# Patient Record
Sex: FEMALE
[De-identification: names, ages, dates, MRNs, and addresses within clinical notes are randomized per-mention and may not be internally consistent; named-entity substitution may affect disease eponyms.]

---
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
## 2021-06-22 ENCOUNTER — APPOINTMENT (OUTPATIENT)
Dept: INTERNAL MEDICINE | Facility: CLINIC | Age: 21
End: 2021-06-22
Payer: COMMERCIAL

## 2021-06-22 VITALS
HEIGHT: 66 IN | RESPIRATION RATE: 14 BRPM | WEIGHT: 126.2 LBS | BODY MASS INDEX: 20.28 KG/M2 | HEART RATE: 71 BPM | OXYGEN SATURATION: 99 % | DIASTOLIC BLOOD PRESSURE: 64 MMHG | TEMPERATURE: 97.8 F | SYSTOLIC BLOOD PRESSURE: 97 MMHG

## 2021-06-22 DIAGNOSIS — S99.912A UNSPECIFIED INJURY OF LEFT ANKLE, INITIAL ENCOUNTER: ICD-10-CM

## 2021-06-22 DIAGNOSIS — Z23 ENCOUNTER FOR IMMUNIZATION: ICD-10-CM

## 2021-06-22 DIAGNOSIS — R63.6 UNDERWEIGHT: ICD-10-CM

## 2021-06-22 DIAGNOSIS — R53.81 OTHER MALAISE: ICD-10-CM

## 2021-06-22 DIAGNOSIS — R53.83 OTHER MALAISE: ICD-10-CM

## 2021-06-22 PROCEDURE — 99214 OFFICE O/P EST MOD 30 MIN: CPT

## 2021-06-24 DIAGNOSIS — Z23 ENCOUNTER FOR IMMUNIZATION: ICD-10-CM

## 2021-06-25 LAB
HBV SURFACE AB SER QL: NONREACTIVE
M TB IFN-G BLD-IMP: NEGATIVE
MEV IGG FLD QL IA: 260 AU/ML
MEV IGG+IGM SER-IMP: POSITIVE
MUV AB SER-ACNC: POSITIVE
MUV IGG SER QL IA: >300 AU/ML
QUANTIFERON TB PLUS MITOGEN MINUS NIL: >10 IU/ML
QUANTIFERON TB PLUS NIL: 0.02 IU/ML
QUANTIFERON TB PLUS TB1 MINUS NIL: 0 IU/ML
QUANTIFERON TB PLUS TB2 MINUS NIL: 0 IU/ML
RUBV IGG FLD-ACNC: 13 INDEX
RUBV IGG SER-IMP: POSITIVE
VZV AB TITR SER: POSITIVE
VZV IGG SER IF-ACNC: 1291 INDEX

## 2022-10-21 ENCOUNTER — APPOINTMENT (OUTPATIENT)
Dept: INTERNAL MEDICINE | Facility: CLINIC | Age: 22
End: 2022-10-21

## 2022-10-21 VITALS
WEIGHT: 135 LBS | HEART RATE: 83 BPM | BODY MASS INDEX: 21.69 KG/M2 | HEIGHT: 66 IN | DIASTOLIC BLOOD PRESSURE: 57 MMHG | OXYGEN SATURATION: 98 % | SYSTOLIC BLOOD PRESSURE: 106 MMHG | TEMPERATURE: 98 F

## 2022-10-21 DIAGNOSIS — G47.9 SLEEP DISORDER, UNSPECIFIED: ICD-10-CM

## 2022-10-21 DIAGNOSIS — E55.9 VITAMIN D DEFICIENCY, UNSPECIFIED: ICD-10-CM

## 2022-10-21 DIAGNOSIS — Z78.9 OTHER SPECIFIED HEALTH STATUS: ICD-10-CM

## 2022-10-21 DIAGNOSIS — Z00.00 ENCOUNTER FOR GENERAL ADULT MEDICAL EXAMINATION W/OUT ABNORMAL FINDINGS: ICD-10-CM

## 2022-10-21 DIAGNOSIS — H54.7 UNSPECIFIED VISUAL LOSS: ICD-10-CM

## 2022-10-21 DIAGNOSIS — Z11.1 ENCOUNTER FOR SCREENING FOR RESPIRATORY TUBERCULOSIS: ICD-10-CM

## 2022-10-21 DIAGNOSIS — E28.2 POLYCYSTIC OVARIAN SYNDROME: ICD-10-CM

## 2022-10-21 DIAGNOSIS — L71.9 ROSACEA, UNSPECIFIED: ICD-10-CM

## 2022-10-21 DIAGNOSIS — Z01.84 ENCOUNTER FOR ANTIBODY RESPONSE EXAMINATION: ICD-10-CM

## 2022-10-21 DIAGNOSIS — N83.202 UNSPECIFIED OVARIAN CYST, LEFT SIDE: ICD-10-CM

## 2022-10-21 DIAGNOSIS — L85.3 XEROSIS CUTIS: ICD-10-CM

## 2022-10-21 PROCEDURE — 99395 PREV VISIT EST AGE 18-39: CPT

## 2022-10-21 RX ORDER — ACETAMINOPHEN 160 MG
TABLET,DISINTEGRATING ORAL
Refills: 0 | Status: DISCONTINUED | COMMUNITY
End: 2022-10-21

## 2022-10-21 RX ORDER — ERGOCALCIFEROL 1.25 MG/1
1.25 MG CAPSULE, LIQUID FILLED ORAL
Qty: 8 | Refills: 0 | Status: DISCONTINUED | COMMUNITY
Start: 2020-09-02 | End: 2022-10-21

## 2025-01-15 ENCOUNTER — NON-APPOINTMENT (OUTPATIENT)
Age: 25
End: 2025-01-15

## 2025-01-15 ENCOUNTER — EMERGENCY (EMERGENCY)
Facility: HOSPITAL | Age: 25
LOS: 1 days | Discharge: HOME | End: 2025-01-15
Attending: EMERGENCY MEDICINE
Payer: COMMERCIAL

## 2025-01-15 VITALS
TEMPERATURE: 100 F | HEIGHT: 66 IN | WEIGHT: 139.99 LBS | DIASTOLIC BLOOD PRESSURE: 74 MMHG | OXYGEN SATURATION: 99 % | HEART RATE: 121 BPM | RESPIRATION RATE: 20 BRPM | SYSTOLIC BLOOD PRESSURE: 100 MMHG

## 2025-01-15 LAB
ALBUMIN SERPL ELPH-MCNC: 4.5 G/DL — SIGNIFICANT CHANGE UP (ref 3.3–5)
ALP SERPL-CCNC: 71 U/L — SIGNIFICANT CHANGE UP (ref 40–120)
ALT FLD-CCNC: 8 U/L — LOW (ref 10–45)
ANION GAP SERPL CALC-SCNC: 12 MMOL/L — SIGNIFICANT CHANGE UP (ref 5–17)
APPEARANCE UR: CLEAR — SIGNIFICANT CHANGE UP
APTT BLD: 34 SEC — SIGNIFICANT CHANGE UP (ref 24.5–35.6)
AST SERPL-CCNC: 12 U/L — SIGNIFICANT CHANGE UP (ref 10–40)
BACTERIA # UR AUTO: NEGATIVE /HPF — SIGNIFICANT CHANGE UP
BASOPHILS # BLD AUTO: 0.04 K/UL — SIGNIFICANT CHANGE UP (ref 0–0.2)
BASOPHILS NFR BLD AUTO: 0.4 % — SIGNIFICANT CHANGE UP (ref 0–2)
BILIRUB SERPL-MCNC: 0.8 MG/DL — SIGNIFICANT CHANGE UP (ref 0.2–1.2)
BILIRUB UR-MCNC: NEGATIVE — SIGNIFICANT CHANGE UP
BUN SERPL-MCNC: 11 MG/DL — SIGNIFICANT CHANGE UP (ref 7–23)
CALCIUM SERPL-MCNC: 9.3 MG/DL — SIGNIFICANT CHANGE UP (ref 8.4–10.5)
CAST: 0 /LPF — SIGNIFICANT CHANGE UP (ref 0–4)
CHLORIDE SERPL-SCNC: 102 MMOL/L — SIGNIFICANT CHANGE UP (ref 96–108)
CO2 SERPL-SCNC: 22 MMOL/L — SIGNIFICANT CHANGE UP (ref 22–31)
COLOR SPEC: YELLOW — SIGNIFICANT CHANGE UP
CREAT SERPL-MCNC: 0.83 MG/DL — SIGNIFICANT CHANGE UP (ref 0.5–1.3)
DIFF PNL FLD: ABNORMAL
EGFR: 102 ML/MIN/1.73M2 — SIGNIFICANT CHANGE UP
EOSINOPHIL # BLD AUTO: 0.03 K/UL — SIGNIFICANT CHANGE UP (ref 0–0.5)
EOSINOPHIL NFR BLD AUTO: 0.3 % — SIGNIFICANT CHANGE UP (ref 0–6)
FLUAV AG NPH QL: SIGNIFICANT CHANGE UP
FLUBV AG NPH QL: SIGNIFICANT CHANGE UP
GAS PNL BLDV: SIGNIFICANT CHANGE UP
GLUCOSE SERPL-MCNC: 114 MG/DL — HIGH (ref 70–99)
GLUCOSE UR QL: NEGATIVE MG/DL — SIGNIFICANT CHANGE UP
HCG SERPL-ACNC: <2 MIU/ML — SIGNIFICANT CHANGE UP
HCT VFR BLD CALC: 34.4 % — LOW (ref 34.5–45)
HCT VFR BLD CALC: 37.2 % — SIGNIFICANT CHANGE UP (ref 34.5–45)
HCT VFR BLD CALC: 38.3 % — SIGNIFICANT CHANGE UP (ref 34.5–45)
HGB BLD-MCNC: 11.6 G/DL — SIGNIFICANT CHANGE UP (ref 11.5–15.5)
HGB BLD-MCNC: 12.3 G/DL — SIGNIFICANT CHANGE UP (ref 11.5–15.5)
HGB BLD-MCNC: 12.8 G/DL — SIGNIFICANT CHANGE UP (ref 11.5–15.5)
IMM GRANULOCYTES NFR BLD AUTO: 0.5 % — SIGNIFICANT CHANGE UP (ref 0–0.9)
INR BLD: 1.09 RATIO — SIGNIFICANT CHANGE UP (ref 0.85–1.16)
KETONES UR-MCNC: NEGATIVE MG/DL — SIGNIFICANT CHANGE UP
LEUKOCYTE ESTERASE UR-ACNC: ABNORMAL
LIDOCAIN IGE QN: 20 U/L — SIGNIFICANT CHANGE UP (ref 7–60)
LYMPHOCYTES # BLD AUTO: 0.59 K/UL — LOW (ref 1–3.3)
LYMPHOCYTES # BLD AUTO: 5.4 % — LOW (ref 13–44)
MCHC RBC-ENTMCNC: 28.9 PG — SIGNIFICANT CHANGE UP (ref 27–34)
MCHC RBC-ENTMCNC: 29.1 PG — SIGNIFICANT CHANGE UP (ref 27–34)
MCHC RBC-ENTMCNC: 29.4 PG — SIGNIFICANT CHANGE UP (ref 27–34)
MCHC RBC-ENTMCNC: 33.1 G/DL — SIGNIFICANT CHANGE UP (ref 32–36)
MCHC RBC-ENTMCNC: 33.4 G/DL — SIGNIFICANT CHANGE UP (ref 32–36)
MCHC RBC-ENTMCNC: 33.7 G/DL — SIGNIFICANT CHANGE UP (ref 32–36)
MCV RBC AUTO: 86.2 FL — SIGNIFICANT CHANGE UP (ref 80–100)
MCV RBC AUTO: 86.5 FL — SIGNIFICANT CHANGE UP (ref 80–100)
MCV RBC AUTO: 88.8 FL — SIGNIFICANT CHANGE UP (ref 80–100)
MONOCYTES # BLD AUTO: 0.97 K/UL — HIGH (ref 0–0.9)
MONOCYTES NFR BLD AUTO: 9 % — SIGNIFICANT CHANGE UP (ref 2–14)
NEUTROPHILS # BLD AUTO: 9.15 K/UL — HIGH (ref 1.8–7.4)
NEUTROPHILS NFR BLD AUTO: 84.4 % — HIGH (ref 43–77)
NITRITE UR-MCNC: NEGATIVE — SIGNIFICANT CHANGE UP
NRBC # BLD: 0 /100 WBCS — SIGNIFICANT CHANGE UP (ref 0–0)
PH UR: 7.5 — SIGNIFICANT CHANGE UP (ref 5–8)
PLATELET # BLD AUTO: 173 K/UL — SIGNIFICANT CHANGE UP (ref 150–400)
PLATELET # BLD AUTO: 173 K/UL — SIGNIFICANT CHANGE UP (ref 150–400)
PLATELET # BLD AUTO: 183 K/UL — SIGNIFICANT CHANGE UP (ref 150–400)
POTASSIUM SERPL-MCNC: 3.9 MMOL/L — SIGNIFICANT CHANGE UP (ref 3.5–5.3)
POTASSIUM SERPL-SCNC: 3.9 MMOL/L — SIGNIFICANT CHANGE UP (ref 3.5–5.3)
PROT SERPL-MCNC: 7.3 G/DL — SIGNIFICANT CHANGE UP (ref 6–8.3)
PROT UR-MCNC: NEGATIVE MG/DL — SIGNIFICANT CHANGE UP
PROTHROM AB SERPL-ACNC: 12.5 SEC — SIGNIFICANT CHANGE UP (ref 9.9–13.4)
RBC # BLD: 3.99 M/UL — SIGNIFICANT CHANGE UP (ref 3.8–5.2)
RBC # BLD: 4.19 M/UL — SIGNIFICANT CHANGE UP (ref 3.8–5.2)
RBC # BLD: 4.43 M/UL — SIGNIFICANT CHANGE UP (ref 3.8–5.2)
RBC # FLD: 11.9 % — SIGNIFICANT CHANGE UP (ref 10.3–14.5)
RBC # FLD: 11.9 % — SIGNIFICANT CHANGE UP (ref 10.3–14.5)
RBC # FLD: 12.1 % — SIGNIFICANT CHANGE UP (ref 10.3–14.5)
RBC CASTS # UR COMP ASSIST: 4 /HPF — SIGNIFICANT CHANGE UP (ref 0–4)
RSV RNA NPH QL NAA+NON-PROBE: SIGNIFICANT CHANGE UP
SARS-COV-2 RNA SPEC QL NAA+PROBE: SIGNIFICANT CHANGE UP
SODIUM SERPL-SCNC: 136 MMOL/L — SIGNIFICANT CHANGE UP (ref 135–145)
SP GR SPEC: 1.01 — SIGNIFICANT CHANGE UP (ref 1–1.03)
SQUAMOUS # UR AUTO: 1 /HPF — SIGNIFICANT CHANGE UP (ref 0–5)
UROBILINOGEN FLD QL: 0.2 MG/DL — SIGNIFICANT CHANGE UP (ref 0.2–1)
WBC # BLD: 10.56 K/UL — HIGH (ref 3.8–10.5)
WBC # BLD: 10.83 K/UL — HIGH (ref 3.8–10.5)
WBC # BLD: 8.77 K/UL — SIGNIFICANT CHANGE UP (ref 3.8–10.5)
WBC # FLD AUTO: 10.56 K/UL — HIGH (ref 3.8–10.5)
WBC # FLD AUTO: 10.83 K/UL — HIGH (ref 3.8–10.5)
WBC # FLD AUTO: 8.77 K/UL — SIGNIFICANT CHANGE UP (ref 3.8–10.5)
WBC UR QL: 2 /HPF — SIGNIFICANT CHANGE UP (ref 0–5)

## 2025-01-15 PROCEDURE — 74177 CT ABD & PELVIS W/CONTRAST: CPT | Mod: 26

## 2025-01-15 PROCEDURE — 93975 VASCULAR STUDY: CPT | Mod: 26

## 2025-01-15 PROCEDURE — 99284 EMERGENCY DEPT VISIT MOD MDM: CPT

## 2025-01-15 PROCEDURE — 76830 TRANSVAGINAL US NON-OB: CPT | Mod: 26

## 2025-01-15 PROCEDURE — 99223 1ST HOSP IP/OBS HIGH 75: CPT

## 2025-01-15 RX ORDER — ACETAMINOPHEN 80 MG/.8ML
1000 SOLUTION/ DROPS ORAL ONCE
Refills: 0 | Status: COMPLETED | OUTPATIENT
Start: 2025-01-15 | End: 2025-01-15

## 2025-01-15 RX ORDER — SODIUM CHLORIDE 9 MG/ML
1000 INJECTION, SOLUTION INTRAMUSCULAR; INTRAVENOUS; SUBCUTANEOUS ONCE
Refills: 0 | Status: COMPLETED | OUTPATIENT
Start: 2025-01-15 | End: 2025-01-15

## 2025-01-15 RX ADMIN — ACETAMINOPHEN 1000 MILLIGRAM(S): 80 SOLUTION/ DROPS ORAL at 23:21

## 2025-01-15 RX ADMIN — ACETAMINOPHEN 400 MILLIGRAM(S): 80 SOLUTION/ DROPS ORAL at 11:35

## 2025-01-15 RX ADMIN — SODIUM CHLORIDE 1000 MILLILITER(S): 9 INJECTION, SOLUTION INTRAMUSCULAR; INTRAVENOUS; SUBCUTANEOUS at 11:35

## 2025-01-15 RX ADMIN — ACETAMINOPHEN 400 MILLIGRAM(S): 80 SOLUTION/ DROPS ORAL at 19:38

## 2025-01-15 NOTE — ED CDU PROVIDER DISPOSITION NOTE - DISCHARGE REVIEW MATERIAL PRESENTED
. DVT PPx: on therapeutic enoxaparin  Diet: CC  Transitions of Care Status:  1.  Name of PCP:  2.  PCP Contacted on Admission: [x ] Y    [ ] N    3.  PCP contacted at Discharge: [ ] Y    [ ] N    [ ] N/A  4.  Post-Discharge Appointment Date and Location:  5.  Summary of Handoff given to PCP:

## 2025-01-15 NOTE — ED CDU PROVIDER DISPOSITION NOTE - ATTENDING APP SHARED VISIT CONTRIBUTION OF CARE
Attending MD [place]:   I personally have seen and examined this patient.  Physician assistant note reviewed and agree on plan of care and except where noted.  See below for details.     Seen in CDU [place] 61    24F with PMH/PSH including PCOS sent to the CDU after presenting to the ED with abdominal pain found to have ruptured hemorrhagic cyst with internal intracystic clotted hemorrhage and resultant  moderate volume hemoperitoneum within the pelvis, seen by Ob/Gyn.  Serial Hb stable.  Patient mildly tachycardic but reports white coat tachycardia.  Feels markedly improved, tolerating po.  Reports minimal residual lower abdominal pain.  Denies chest pain, shortness of breath, nausea, vomiting, diarrhea, urinary complaints.     Exam:   General: NAD  HENT: head NCAT, airway patent   Chest: symmetric chest rise, no increased work of breathing, +S1S2, +tachycardic  Abd: soft, minimal tenderness to lower abdomen, no rebound, no guarding, ND  MSK: ranging neck freely  Neuro: moving all extremities spontaneously, sensory grossly intact, no gross neuro deficits  Psych: normal mood and affect     A/P: 24F with ruptured hemorrhagic cyst and hemoperitoneum, Hb stable.  Patient re-evaluated and feeling improved.  Receiving IVFs and Tylenol given tachycardia but otherwise no acute issues at  this time.  Lab and radiology tests reviewed with patient.  Discussed awaiting improvement of HR but given follow up instructions given, importance of follow up emphasized, return to ED parameters reviewed and patient verbalized understanding.  All questions answered, all concerns addressed. Will await re-vital after IVFs and Tylenol. Attending MD [place]:   I personally have seen and examined this patient.  Physician assistant note reviewed and agree on plan of care and except where noted.  See below for details.     Seen in CDU [place] 62    24F with PMH/PSH including PCOS sent to the CDU after presenting to the ED with abdominal pain found to have ruptured hemorrhagic cyst with internal intracystic clotted hemorrhage and resultant  moderate volume hemoperitoneum within the pelvis, seen by Ob/Gyn.  Serial Hb stable.  Patient mildly tachycardic but reports white coat tachycardia.  Feels markedly improved, tolerating po.  Reports minimal residual lower abdominal pain.  Denies chest pain, shortness of breath, nausea, vomiting, diarrhea, urinary complaints.     Exam:   General: NAD  HENT: head NCAT, airway patent   Chest: symmetric chest rise, no increased work of breathing, +S1S2, +tachycardic  Abd: soft, minimal tenderness to lower abdomen, no rebound, no guarding, ND  MSK: ranging neck freely  Neuro: moving all extremities spontaneously, sensory grossly intact, no gross neuro deficits  Psych: normal mood and affect     A/P: 24F with ruptured hemorrhagic cyst and hemoperitoneum, Hb stable.  Patient re-evaluated and feeling improved.  Receiving IVFs and Tylenol given tachycardia but otherwise no acute issues at  this time.  Lab and radiology tests reviewed with patient.  Vitals improved.  Stable for discharge. Follow up instructions given, importance of follow up emphasized, return to ED parameters reviewed and patient verbalized understanding.  All questions answered, all concerns addressed.

## 2025-01-15 NOTE — ED PROVIDER NOTE - PROGRESS NOTE DETAILS
PELVIC: Normal external genitalia, no lesions. Normal vaginal discharge. No bleeding noted. Bimanual normal- No CMT or adnexal TTP. Chaperoned by Dr. Yoon. Asia Vinson PA-C Pt reports improvement in pain. Transvag US results reviewed with pt. Given moderate hemoperitoneum will continue to trend cbc and monitor overnight. Agrees with plan. Asia Vinson PA-C Patient informed of test results has mild tenderness in the lower abdomen reports LMP Dec 16, will have cbc monitoring and pain control awaiting gyn eval

## 2025-01-15 NOTE — ED ADULT NURSE REASSESSMENT NOTE - NS ED NURSE REASSESS COMMENT FT1
Pt received from MEDARDO Vigil at 19:00hrs. Pt A&O x 4, independent Pt in CDU for pain control, trending CBC's. Patient spiked temp 100.6. c/o milld abdominal pain. MISTY Fox notified, IV Tylenol given as ordered. Viral panel sent. Pt denies any chest pain, SOB, dizziness or palpitations. Next CBC at 23:00hrs. V/S stable, IV 20G Lt AC, patent and free of signs of infiltration. Pt resting in bed. Safety & comfort measures maintained. Call bell in reach. Will continue to monitor.

## 2025-01-15 NOTE — ED ADULT NURSE REASSESSMENT NOTE - NSFALLUNIVINTERV_ED_ALL_ED
Bed/Stretcher in lowest position, wheels locked, appropriate side rails in place/Call bell, personal items and telephone in reach/Instruct patient to call for assistance before getting out of bed/chair/stretcher/Non-slip footwear applied when patient is off stretcher/[place] to call system/Physically safe environment - no spills, clutter or unnecessary equipment/Purposeful proactive rounding/Room/bathroom lighting operational, light cord in reach

## 2025-01-15 NOTE — ED ADULT NURSE NOTE - OBJECTIVE STATEMENT
Pt presents to ED from home complaining of bilateral lower abdominal pain x3 days, fever/chills. Pt denies chest pain, chest palpitations, SOB, n/v/d, difficulty/frequent urination/difficulty having a BM. PMH of PCOS. Pt presents at baseline mental status, AAOx4. Plan of care and monitoring discussed with pt. Bed locked and lowered for safety. Safety and comfort maintained.

## 2025-01-15 NOTE — CONSULT NOTE ADULT - ATTENDING COMMENTS
Patient discussed with resident. Presents with complaints of lower abdominal pain for a few days but worsened on the morning of presentation.   Denies nausea/vomiting or abnormal vaginal discharge.  Past medical and surgical history reviewed.   Vitals reviewed, notable for tachycardia to 110s and elevated initial temp 100.4   Physical exam reviewed   Labs and imaging reviewed -- ruptured cyst with moderate hemoperitoneum on CT scan; no ovarian torsion on sono   Patient underwent prolonged monitoring with stable hemoglobin and abdominal exam   -no acute GYN intervention required   -assess for other etiologies of elevated temp   -patient to follow up outpatient with MIKKI Shepherd

## 2025-01-15 NOTE — ED CDU PROVIDER DISPOSITION NOTE - PATIENT PORTAL LINK FT
You can access the FollowMyHealth Patient Portal offered by [facility] by registering at the following website: [email or web address]. By joining [facility]’s FollowMyHealth portal, you will also be able to view your health information using other applications (apps) compatible with our system.

## 2025-01-15 NOTE — ED CDU PROVIDER DISPOSITION NOTE - CLINICAL COURSE
· HPI Objective Statement: 25 y/o F with PMH of PCOS and no prior surgical history presents to the ED with mother complaining of abdominal pain. She is reporting lower abdominal pain since Sunday, was seen at urgent care today and was sent to ED to rule out appendicitis. Patient states the pain is non radiating and constant, she reports some relief with a heating pad but she has not taken any pain medication. No other exaggerating/alleviating factors. Reports mild fever, no chills. Still with good appetite, denies nausea/vomiting, diarrhea, chest pain, cough, SOB, headache, dizziness, and any urinary symptoms. Denies hx of STI/STDs. LMP 12/18. Sexually active with 1 male partner, uses contraception. No vaginal discharge, odor, bleeding.  In the ED, pt with stable Vs. Labs non actionable, H/H 12.8/38.3. UA negative for infection, no blood. transvag US revealed "Ruptured cyst with internal intracystic clotted hemorrhage and resultant  moderate volume hemoperitoneum within the pelvis." pt sp IV tylenol w/ improvement in pain. CT a/p with iv contrast pending. GYN cs placed. Plan for CDU for cntd serial cbc and abd exams.  In the CDU*** · HPI Objective Statement: 25 y/o F with PMH of PCOS and no prior surgical history presents to the ED with mother complaining of abdominal pain. She is reporting lower abdominal pain since Sunday, was seen at urgent care today and was sent to ED to rule out appendicitis. Patient states the pain is non radiating and constant, she reports some relief with a heating pad but she has not taken any pain medication. No other exaggerating/alleviating factors. Reports mild fever, no chills. Still with good appetite, denies nausea/vomiting, diarrhea, chest pain, cough, SOB, headache, dizziness, and any urinary symptoms. Denies hx of STI/STDs. LMP 12/18. Sexually active with 1 male partner, uses contraception. No vaginal discharge, odor, bleeding.  In the ED, pt with stable Vs. Labs non actionable, H/H 12.8/38.3. UA negative for infection, no blood. transvag US revealed "Ruptured cyst with internal intracystic clotted hemorrhage and resultant  moderate volume hemoperitoneum within the pelvis." pt sp IV tylenol w/ improvement in pain. CT a/p with iv contrast pending. GYN cs placed. Plan for CDU for cntd serial cbc and abd exams.  In the CDU - hgb stable, pt feeling well, abd pain resolved, OBGYN reevaluated and reported no acute intervention, pt with lowgrade tempeerature, UA and CXR not c/w infx, negative COVID/flu/RSV, as per OBGYN documentation no CMT low concern PID and pt asx. Pt stable at time of DC

## 2025-01-15 NOTE — ED CDU PROVIDER INITIAL DAY NOTE - NS ED ATTENDING STATEMENT MOD
Just a fyi tried to bring pt in this week grandmother stated per pt times dont work for him they will call back to schedule   
noted  
This was a shared visit with the JOHN. I reviewed and verified the documentation.

## 2025-01-15 NOTE — ED CDU PROVIDER INITIAL DAY NOTE - PHYSICAL EXAMINATION
CONSTITUTIONAL: Well appearing and in no apparent distress.  ENT: Airway patent, moist mucous membranes.   EYES: Pupils equal, round and reactive to light. EOMI. Conjunctiva normal appearing.   CARDIAC: Normal rate, regular rhythm.  Heart sounds S1, S2.    RESPIRATORY: Breath sounds clear and equal bilaterally.   GASTROINTESTINAL: Abdomen soft, non-tender, not distended.  MUSCULOSKELETAL: Spine appears normal.  NEUROLOGICAL: Alert and oriented x3, no focal deficits.

## 2025-01-15 NOTE — ED CDU PROVIDER INITIAL DAY NOTE - OBJECTIVE STATEMENT
25 y/o F with PMH of PCOS and no prior surgical history presents to the ED with mother complaining of abdominal pain. She is reporting lower abdominal pain since Sunday, was seen at urgent care today and was sent to ED to rule out appendicitis. Patient states the pain is non radiating and constant, she reports some relief with a heating pad but she has not taken any pain medication. No other exaggerating/alleviating factors. Reports mild fever, no chills. Still with good appetite, denies nausea/vomiting, diarrhea, chest pain, cough, SOB, headache, dizziness, and any urinary symptoms. Denies hx of STI/STDs. LMP 12/18. Sexually active with 1 male partner, uses contraception. No vaginal discharge, odor, bleeding.

## 2025-01-15 NOTE — ED PROVIDER NOTE - PHYSICAL EXAMINATION
T(C): 38 (01-15-25 @ 10:29), Max: 38 (01-15-25 @ 10:29)  HR: 121 (01-15-25 @ 10:29) (121 - 121)  BP: 100/74 (01-15-25 @ 10:29) (100/74 - 100/74)  RR: 20 (01-15-25 @ 10:29) (20 - 20)  SpO2: 99% (01-15-25 @ 10:29) (99% - 99%)    CONSTITUTIONAL: Well groomed, no apparent distress  ENMT: Oral mucosa with moist membranes.   RESP: No respiratory distress, no use of accessory muscles, CTA no wheeze/rhonchi/rales  CV: RRR, +S1S2, tachycardic, no MRG; no JVD; no peripheral edema  GI: Soft, mild RLQ and LLQ tenderness, neg rovsings, neg lagos's, neg rebound tenderness, no guarding; no palpable masses; no hepatosplenomegaly; no hernia palpated  SKIN: No rashes or ulcers noted; no subcutaneous nodules or induration palpable  PSYCH: Appropriate insight/judgment; A+O x 3, mood and affect appropriate

## 2025-01-15 NOTE — CONSULT NOTE ADULT - SUBJECTIVE AND OBJECTIVE BOX
JOSE G MONAE  24y  Female [id number]    HPI: 24y G0 LMP 18 presenting with abdominal pain found to have hemorrhagic cyst. Patients notes abdominal pain over the past few days, worsening this AM with improvement since presentation. Patient denies N/V, subjective fevers or chills. Denies fever at home or sick contacts. Patient sexually active with 1 partner, uses condoms for contraception. Denies concerns for STIs, irregular vaginal discharge.       Name of GYN Physician: PA with [facility], last seen in     ObHx: Denies   GynHx: History of cysts, per patient small, not followed further, PCOS  PMHx: denies   SurgHx: denies   Meds: Denies       Vital Signs Last 24 Hrs  T(C): 37.3 (15 Jeffry 2025 16:34), Max: 38 (15 Jeffry 2025 10:29)  T(F): 99.1 (15 Jeffry 2025 16:34), Max: 100.4 (15 Jeffry 2025 10:29)  HR: 119 (15 Jeffry 2025 16:34) (107 - 121)  BP: 101/62 (15 Jeffry 2025 16:34) (100/74 - 123/83)  BP(mean): 75 (15 Jeffry 2025 16:34) (75 - 92)  RR: 18 (15 Jeffry 2025 16:34) (18 - 20)  SpO2: 99% (15 Jeffry 2025 16:34) (99% - 100%)    Parameters below as of 15 Jeffry 2025 16:34  Patient On (Oxygen Delivery Method): room air        Physical Exam:   General: sitting comfortably in bed, NAD   Lungs: non labored breathing on RA  Abd: Soft, non-distended, no rebound or guarding, minimal tenderness to deep palpation on the L side, warm to touch  :  No bleeding on pad. External labia wnl. Bimanual exam with no cervical motion tenderness, uterus wnl, adnexa non palpable b/l.  Cervix closed  Speculum Exam: No active bleeding from os. Physiologic discharge.  Ext: non-tender b/l, no edema     Chaperoned by PATRICIA Blood     LABS:                              11.6   10.56 )-----------( 173      ( 15 Jeffry 2025 16:56 )             34.4     01-15    136  |  102  |  11  ----------------------------<  114[H]  3.9   |  22  |  0.83    Ca    9.3      15 Jeffry 2025 11:34    TPro  7.3  /  Alb  4.5  /  TBili  0.8  /  DBili  x   /  AST  12  /  ALT  8[L]  /  AlkPhos  71  01-15    I&O's Detail    PT/INR - ( 15 Jeffry 2025 11:34 )   PT: 12.5 sec;   INR: 1.09 ratio         PTT - ( 15 Jeffry 2025 11:34 )  PTT:34.0 sec  Urinalysis Basic - ( 15 Jeffry 2025 11:34 )    Color: Yellow / Appearance: Clear / S.008 / pH: x  Gluc: 114 mg/dL / Ketone: Negative mg/dL  / Bili: Negative / Urobili: 0.2 mg/dL   Blood: x / Protein: Negative mg/dL / Nitrite: Negative   Leuk Esterase: Trace / RBC: 4 /HPF / WBC 2 /HPF   Sq Epi: x / Non Sq Epi: 1 /HPF / Bacteria: Negative /HPF        RADIOLOGY & ADDITIONAL STUDIES:    < from: US Transvaginal (01.15.25 @ 15:13) >    ACC: [id number] EXAM:  US DPLX PELVIC   ORDERED BY:  PAT TERRELL     ACC: [id number] EXAM:  US TRANSVAGINAL   ORDERED BY:  PAT TERRELL     PROCEDURE DATE:  01/15/2025          INTERPRETATION:  CLINICAL INFORMATION: 23-year-old female withhistory of   PCOS and pelvic pain the past 4 days. Negative beta-hCG    LMP: 2024    COMPARISON: None available.    TECHNIQUE:  Endovaginal and transabdominal pelvic sonogram. Color and Spectral   Doppler was performed.    FINDINGS:  Uterus: 8.3 x 4.4 x 4.2 cm. Within normal limits.  Endometrium: 10 mm. Within normal limits.    Right ovary: 2.8 cm x 2.9 cm x 2.0 cm. Within normal limits.  Left ovary: 6.1 cm x 4.7 cm x 5.2 cm. complex cyst measuring 5.8 x 4.6 x   3.9 cm with internal hemorrhagic clot.    Fluid: Moderate volume free fluid with fine echoes.    IMPRESSION:    Ruptured cyst with internal intracystic clotted hemorrhage and resultant  moderate volume hemoperitoneum within the pelvics.    No ovarian torsion.          --- End ofReport ---          TREVIN AGRAWAL MD; Resident Radiologist  This document has been electronically signed.  REGINA DEL REAL MD; Attending Radiologist  This document has been electronically signed. Jeffry 15 2025  3:53PM    < end of copied text >  < from: CT Abdomen and Pelvis w/ IV Cont (01.15.25 @ 16:39) >    ACC: [id number] EXAM:  CT ABDOMEN AND PELVIS IC   ORDERED BY:  PAT TERRELL     PROCEDURE DATE:  01/15/2025          INTERPRETATION:  CLINICAL INFORMATION: Right lower quadrant pain and   fever.    COMPARISON: Same day pelvic ultrasound.    CONTRAST/COMPLICATIONS:  IV Contrast: Omnipaque 350  90 cc administered   10 cc discarded  Oral Contrast: NONE  .    PROCEDURE:  CT of the Abdomen and Pelvis was performed.  Sagittal and coronal reformats were performed.    FINDINGS:  LOWER CHEST: Within normal limits.    LIVER: Within normal limits.  BILE DUCTS: Normal caliber.  GALLBLADDER: Within normal limits.  SPLEEN: Within normal limits.  PANCREAS: Within normal limits.  ADRENALS: Within normal limits.  KIDNEYS/URETERS: Within normal limits.    BLADDER: Within normal limits.  REPRODUCTIVE ORGANS: Heterogenous lesion in the left adnexa, likely a   hemorrhagic cyst, measuring 5.4 cm.    BOWEL: No bowel obstruction. Appendix is normal.  PERITONEUM/RETROPERITONEUM: Moderate volume hemoperitoneum in the pelvis.  VESSELS: Left-sided IVC, an anatomical variant.  LYMPH NODES: No lymphadenopathy.  ABDOMINAL WALL: Within normal limits.  BONES: Within normal limits.    IMPRESSION:  Suspicious for ruptured hemorrhagic cyst with moderate hemoperitoneum in   the pelvis.    --- End of Report ---          NABOR LOPEZ MD; Resident Radiologist  This document has been electronically signed.  NIALL MORFIN MD; Attending Radiologist  This document has been electronically signed. Jeffry 15 2025  5:06PM    < end of copied text >

## 2025-01-15 NOTE — ED PROVIDER NOTE - ATTENDING APP SHARED VISIT CONTRIBUTION OF CARE
Patient is a 24-year-old female with a history of PCOS, here with her mother for evaluation of lower abdominal discomfort since Sunday evening.  Patient states that she had a lot of kale and made a cheese based–dish.  She states she thought that she had bloating and gas.  She reports that she has been passing gas but the pain has persisted since Sunday evening.  She denies any vomiting or diarrhea.  She denies any vaginal discharge, urinary symptoms.  Patient went to urgent care and was sent to the emergency department to rule out appendicitis.  She reports that she does have a history of ovarian cyst.  She denies any history of STDs.  She reports that she is sexually active.  No chest pain, shortness of breath.  No fevers, chills.      VS noted  Gen. no acute distress, Non toxic   HEENT: EOMI, mmm  Lungs: CTAB/L no C/ W /R   CVS: RRR   Abd; Soft, TTP in lower abdomen, RLQ, no rebound or guarding, no CVA tenderness bilaterally   : Done by MISTY Vinson in my presence:  normal external genitalia, scant discharge, no CMT, no adnexal tenderness bilaterally  Ext: no edema  Skin: no rash  Neuro AAOx3 non focal clear speech  a/p:  lower abdominal pain x 4 days–patient has mild lower abdominal tenderness.  Differential includes appendicitis, gas related pain, ovarian cyst, less likely torsion. Plan for labs, CT A/P, TVUS, pain control, urinalysis  - Karrie WILKES

## 2025-01-15 NOTE — ED CDU PROVIDER DISPOSITION NOTE - NSFOLLOWUPINSTRUCTIONS_ED_ALL_ED_FT
Please make sure to follow up with your primary care doctor within 1-2 days and with the OBGYN specialist. The information for follow up can be found below. Bring a copy of all of your results with you to your follow up appointments.   Return to the ER as discussed if you develop any new or worsening symptoms.       OBGYN: 1) Follow-up with your primary care provider in 1-2 days.     Follow-up with OBGYN in 1-2 weeks.        You may see Dr. Monserrat Hernandez [phone]    2) Continue to take all medications as prescribed.    3) Rest and stay hydrated. Pain can be managed with Acetaminophen (aka Tylenol) and Ibuprofen (aka Motrin or Advil) over the counter as directed.    4) Return to the ER for any new or worsening symptoms, lightheadedness, dizziness, weakness, abdominal pain, pelvic pain, high fevers, or if any other concerns.        Please read all attached patient information.

## 2025-01-15 NOTE — CONSULT NOTE ADULT - ASSESSMENT
Assessment: 24y G0 LMP 12/18 presenting with abdominal pain found to have hemorrhagic cyst. Patient with TVUS showing 5.8cm complex cyst with internal hemorrhagic clot, mod free  fluid and CT showing 5.4cm hemorrhagic with suspected rupture. Patient with Hgb wnl, improvement in pain, limited concern for . Patient with tachycardia and fever, no evidence of infection on gyn exam without irregular discharge, non tender on bimanual, limited concern for PID at this time or Gyn infection. Patient skin warm to touch on exam.    Recommendations:   - Patient appears stable regarding hemorrhagic cyst   - Appreciate care per CDU   - Rule out other causes of fever   - No acute gyn interventions at this time     D/w Dr. Joao Gonzalez, PGY2

## 2025-01-15 NOTE — ED PROVIDER NOTE - MDM ORDERS SUBMITTED SELECTION
Pt to ed from home via triage with c/o palpitations with left side chest pain and abdominal pain. Pt reports onset of palpatations after work at 1600. Pt states she recently had dx of hyperkalemia and is concerned that rapid heart beat is related to same  Pt cap refill less than 3 sec  Respirations even and unlabored. Skin WDI.  Pt amb with steady gait Imaging Studies/Medications

## 2025-01-15 NOTE — ED PROVIDER NOTE - OBJECTIVE STATEMENT
23 y/o F with PMH of PCOS and no prior surgical history presents to the ED with mother complaining of abdominal pain. She is reporting lower abdominal pain since Sunday, was seen at urgent care today and was sent to ED to rule out appendicitis. Patient states the pain is non radiating and constant, she reports some relief with a heating pad but she has not taken any pain medication. No other exaggerating/alleviating factors. Reports mild fever but still with good appetite, denies nausea/vomiting, diarrhea, chest pain, cough, SOB, headache, dizziness, and any urinary symptoms. Denies hx of STI/STDs. LMP 12/18. 23 y/o F with PMH of PCOS and no prior surgical history presents to the ED with mother complaining of abdominal pain. She is reporting lower abdominal pain since Sunday, was seen at urgent care today and was sent to ED to rule out appendicitis. Patient states the pain is non radiating and constant, she reports some relief with a heating pad but she has not taken any pain medication. No other exaggerating/alleviating factors. Reports mild fever, no chills. Still with good appetite, denies nausea/vomiting, diarrhea, chest pain, cough, SOB, headache, dizziness, and any urinary symptoms. Denies hx of STI/STDs. LMP 12/18. Sexually active with 1 male partner, uses contraception. No vaginal discharge, odor, bleeding.

## 2025-01-16 ENCOUNTER — TRANSCRIPTION ENCOUNTER (OUTPATIENT)
Age: 25
End: 2025-01-16

## 2025-01-16 VITALS
TEMPERATURE: 99 F | HEART RATE: 99 BPM | DIASTOLIC BLOOD PRESSURE: 58 MMHG | SYSTOLIC BLOOD PRESSURE: 91 MMHG | OXYGEN SATURATION: 98 % | RESPIRATION RATE: 18 BRPM

## 2025-01-16 LAB
CULTURE RESULTS: SIGNIFICANT CHANGE UP
HCT VFR BLD CALC: 36 % — SIGNIFICANT CHANGE UP (ref 34.5–45)
HGB BLD-MCNC: 12 G/DL — SIGNIFICANT CHANGE UP (ref 11.5–15.5)
MCHC RBC-ENTMCNC: 29.6 PG — SIGNIFICANT CHANGE UP (ref 27–34)
MCHC RBC-ENTMCNC: 33.3 G/DL — SIGNIFICANT CHANGE UP (ref 32–36)
MCV RBC AUTO: 88.7 FL — SIGNIFICANT CHANGE UP (ref 80–100)
NRBC # BLD: 0 /100 WBCS — SIGNIFICANT CHANGE UP (ref 0–0)
PLATELET # BLD AUTO: 166 K/UL — SIGNIFICANT CHANGE UP (ref 150–400)
RBC # BLD: 4.06 M/UL — SIGNIFICANT CHANGE UP (ref 3.8–5.2)
RBC # FLD: 11.9 % — SIGNIFICANT CHANGE UP (ref 10.3–14.5)
SPECIMEN SOURCE: SIGNIFICANT CHANGE UP
WBC # BLD: 8.43 K/UL — SIGNIFICANT CHANGE UP (ref 3.8–10.5)
WBC # FLD AUTO: 8.43 K/UL — SIGNIFICANT CHANGE UP (ref 3.8–10.5)

## 2025-01-16 PROCEDURE — 82330 ASSAY OF CALCIUM: CPT

## 2025-01-16 PROCEDURE — 93975 VASCULAR STUDY: CPT

## 2025-01-16 PROCEDURE — 83690 ASSAY OF LIPASE: CPT

## 2025-01-16 PROCEDURE — 82435 ASSAY OF BLOOD CHLORIDE: CPT

## 2025-01-16 PROCEDURE — 85730 THROMBOPLASTIN TIME PARTIAL: CPT

## 2025-01-16 PROCEDURE — 81001 URINALYSIS AUTO W/SCOPE: CPT

## 2025-01-16 PROCEDURE — 84295 ASSAY OF SERUM SODIUM: CPT

## 2025-01-16 PROCEDURE — 0241U: CPT

## 2025-01-16 PROCEDURE — 74177 CT ABD & PELVIS W/CONTRAST: CPT | Mod: MC

## 2025-01-16 PROCEDURE — 87086 URINE CULTURE/COLONY COUNT: CPT

## 2025-01-16 PROCEDURE — 85025 COMPLETE CBC W/AUTO DIFF WBC: CPT

## 2025-01-16 PROCEDURE — 36415 COLL VENOUS BLD VENIPUNCTURE: CPT

## 2025-01-16 PROCEDURE — 99285 EMERGENCY DEPT VISIT HI MDM: CPT | Mod: 25

## 2025-01-16 PROCEDURE — 85014 HEMATOCRIT: CPT

## 2025-01-16 PROCEDURE — 71046 X-RAY EXAM CHEST 2 VIEWS: CPT | Mod: 26

## 2025-01-16 PROCEDURE — 83605 ASSAY OF LACTIC ACID: CPT

## 2025-01-16 PROCEDURE — 76830 TRANSVAGINAL US NON-OB: CPT

## 2025-01-16 PROCEDURE — 85610 PROTHROMBIN TIME: CPT

## 2025-01-16 PROCEDURE — 71046 X-RAY EXAM CHEST 2 VIEWS: CPT

## 2025-01-16 PROCEDURE — 84132 ASSAY OF SERUM POTASSIUM: CPT

## 2025-01-16 PROCEDURE — 80053 COMPREHEN METABOLIC PANEL: CPT

## 2025-01-16 PROCEDURE — 85027 COMPLETE CBC AUTOMATED: CPT

## 2025-01-16 PROCEDURE — 82803 BLOOD GASES ANY COMBINATION: CPT

## 2025-01-16 PROCEDURE — 82947 ASSAY GLUCOSE BLOOD QUANT: CPT

## 2025-01-16 PROCEDURE — 87637 SARSCOV2&INF A&B&RSV AMP PRB: CPT

## 2025-01-16 PROCEDURE — 99238 HOSP IP/OBS DSCHRG MGMT 30/<: CPT

## 2025-01-16 PROCEDURE — 96374 THER/PROPH/DIAG INJ IV PUSH: CPT | Mod: XU

## 2025-01-16 PROCEDURE — 85018 HEMOGLOBIN: CPT

## 2025-01-16 PROCEDURE — G0378: CPT

## 2025-01-16 PROCEDURE — 84702 CHORIONIC GONADOTROPIN TEST: CPT

## 2025-01-16 PROCEDURE — 96376 TX/PRO/DX INJ SAME DRUG ADON: CPT

## 2025-01-16 RX ORDER — SODIUM CHLORIDE 9 MG/ML
1000 INJECTION, SOLUTION INTRAMUSCULAR; INTRAVENOUS; SUBCUTANEOUS ONCE
Refills: 0 | Status: COMPLETED | OUTPATIENT
Start: 2025-01-16 | End: 2025-01-16

## 2025-01-16 RX ORDER — ACETAMINOPHEN 80 MG/.8ML
975 SOLUTION/ DROPS ORAL ONCE
Refills: 0 | Status: COMPLETED | OUTPATIENT
Start: 2025-01-16 | End: 2025-01-16

## 2025-01-16 RX ADMIN — ACETAMINOPHEN 975 MILLIGRAM(S): 80 SOLUTION/ DROPS ORAL at 09:11

## 2025-01-16 RX ADMIN — SODIUM CHLORIDE 1000 MILLILITER(S): 9 INJECTION, SOLUTION INTRAMUSCULAR; INTRAVENOUS; SUBCUTANEOUS at 09:10

## 2025-01-16 NOTE — CHART NOTE - NSCHARTNOTEFT_GEN_A_CORE
I evaluated patient at bedside this AM.  She states she feels markedly improved. Currently only rating pain 1-2/10.  Tolerating PO; denies nausea, vomiting.  Denies lightheadedness, dizziness, chest pain, SOB, palpitations.    Abdominal exam is nontender to deep palpation. No rebound or guarding.    Hct appears stable throughout ED stay 38.3->34.4->37.2->36    Clinically stable. No GYN intervention at this time. Remainder of care per ED team.    Recommend following up with OBGYN outpatient as needed.  Can give information for Dr. Monserrat Hernandez [phone]    Discussed with Attending Dr. Joao Veras, PGY-2 I evaluated patient at bedside this AM.  She states she feels markedly improved. Currently only rating pain 1-2/10.  Tolerating PO; denies nausea, vomiting.  Denies lightheadedness, dizziness, chest pain, SOB, palpitations.    Abdominal exam is nontender to deep palpation. No rebound or guarding.    Hct appears stable throughout ED stay 38.3->34.4->37.2->36    Clinically stable. No GYN intervention at this time.  Can take Tylenol 975mg n4lfilr or Motrin 600mg [id number] as needed for pain, with hot compresses.  Remainder of care per ED team.    Recommend following up with OBGYN outpatient as needed.  Can give information for Dr. Monserrat Hernandez [phone]    Discussed with Attending Dr. Joao Veras, PGY-2

## 2025-01-16 NOTE — ED CDU PROVIDER SUBSEQUENT DAY NOTE - ATTENDING APP SHARED VISIT CONTRIBUTION OF CARE
Attending MD [place]:   I personally have seen and examined this patient.  Physician assistant note reviewed and agree on plan of care and except where noted.  See below for details.     Seen in CDU [place] 62    24F with PMH/PSH including PCOS sent to the CDU after presenting to the ED with abdominal pain found to have ruptured hemorrhagic cyst with internal intracystic clotted hemorrhage and resultant  moderate volume hemoperitoneum within the pelvis, seen by Ob/Gyn.  Serial Hb stable.  Patient mildly tachycardic but reports white coat tachycardia.  Feels markedly improved, tolerating po.  Reports minimal residual lower abdominal pain.  Denies chest pain, shortness of breath, nausea, vomiting, diarrhea, urinary complaints.     Exam:   General: NAD  HENT: head NCAT, airway patent   Chest: symmetric chest rise, no increased work of breathing, +S1S2, +tachycardic  Abd: soft, minimal tenderness to lower abdomen, no rebound, no guarding, ND  MSK: ranging neck freely  Neuro: moving all extremities spontaneously, sensory grossly intact, no gross neuro deficits  Psych: normal mood and affect     A/P: 24F with ruptured hemorrhagic cyst and hemoperitoneum, Hb stable.  Patient re-evaluated and feeling improved.  Receiving IVFs and Tylenol given tachycardia but otherwise no acute issues at  this time.  Lab and radiology tests reviewed with patient.  Discussed awaiting improvement of HR but given follow up instructions given, importance of follow up emphasized, return to ED parameters reviewed and patient verbalized understanding.  All questions answered, all concerns addressed. Will await re-vital after IVFs and Tylenol.

## 2025-01-16 NOTE — ED CDU PROVIDER SUBSEQUENT DAY NOTE - PROGRESS NOTE DETAILS
Patient hemoglobin stable on labs this morning.  Seen and examined at bedside, patient is in no acute distress, vital signs showing low-grade temperature and mild tachycardia came patient reports whitecoat tachycardia.  Will give IV fluids and antipyretics and reassess.  Additionally patient written for chest x-ray to evaluate for pneumonia as source of possible fever.  Patient denies any focal infectious symptoms and as per OB/GYN documentation had no CMT on exam and there was low concern for PID.  UA not c.w infx, covid/flu/rsv swab negative. Patient denies urinary complaints URI symptoms, cough.  Patient reports abdominal pain is greatly improved and abdomen is soft nontender on exam.  Will continue to monitor. Chest x-ray independently visualized by myself no focal consolidations.  Patient feeling well and vital signs stable.  OB/GYN evaluated patient this morning and they recommend no GYN intervention, p.o. OTC pain medication, remainder of care per ED team.  Results and plan discussed with patient. VSS, HR 95 on cardiac monitor. pt stable for DC.

## 2025-01-16 NOTE — ED CDU PROVIDER SUBSEQUENT DAY NOTE - HISTORY
No interval changes since initial CDU provider note. Pt feels well without new complaint. NAD VSS. Spoke with GYN earlier regarding fever, states that less likely 2/2 hemorrhagic cyst. H/H stable. Will continue to monitor.  - MISTY Fox

## 2025-01-27 ENCOUNTER — APPOINTMENT (OUTPATIENT)
Dept: OBGYN | Facility: CLINIC | Age: 25
End: 2025-01-27

## 2025-01-27 ENCOUNTER — NON-APPOINTMENT (OUTPATIENT)
Age: 25
End: 2025-01-27

## 2025-01-27 ENCOUNTER — APPOINTMENT (OUTPATIENT)
Dept: OBGYN | Facility: CLINIC | Age: 25
End: 2025-01-27
Payer: COMMERCIAL

## 2025-01-27 VITALS
SYSTOLIC BLOOD PRESSURE: 101 MMHG | BODY MASS INDEX: 22.5 KG/M2 | DIASTOLIC BLOOD PRESSURE: 67 MMHG | HEIGHT: 66 IN | WEIGHT: 140 LBS

## 2025-01-27 DIAGNOSIS — Z01.419 ENCOUNTER FOR GYNECOLOGICAL EXAMINATION (GENERAL) (ROUTINE) W/OUT ABNORMAL FINDINGS: ICD-10-CM

## 2025-01-27 PROCEDURE — 99385 PREV VISIT NEW AGE 18-39: CPT

## 2025-01-29 LAB — CYTOLOGY CVX/VAG DOC THIN PREP: NORMAL

## 2025-04-25 ENCOUNTER — APPOINTMENT (OUTPATIENT)
Dept: OBGYN | Facility: CLINIC | Age: 25
End: 2025-04-25

## 2025-04-25 ENCOUNTER — APPOINTMENT (OUTPATIENT)
Dept: OBGYN | Facility: CLINIC | Age: 25
End: 2025-04-25
Payer: COMMERCIAL

## 2025-04-25 VITALS — SYSTOLIC BLOOD PRESSURE: 95 MMHG | DIASTOLIC BLOOD PRESSURE: 67 MMHG

## 2025-04-25 PROCEDURE — 99213 OFFICE O/P EST LOW 20 MIN: CPT

## 2025-04-25 PROCEDURE — 76830 TRANSVAGINAL US NON-OB: CPT

## 2025-04-30 NOTE — ED ADULT NURSE NOTE - NS ED NURSE RECORD ANOTHER VITAL SIGN
[No, patient denies ideation or behavior] : No, patient denies ideation or behavior [Low acute suicide risk] : Low acute suicide risk [No] : No [No, Reason: ____] : Safety Plan completed/updated (for individuals at risk): No, Reason: [unfilled] Yes

## 2025-05-09 ENCOUNTER — TRANSCRIPTION ENCOUNTER (OUTPATIENT)
Age: 25
End: 2025-05-09

## 2025-05-13 ENCOUNTER — NON-APPOINTMENT (OUTPATIENT)
Age: 25
End: 2025-05-13

## 2025-07-17 ENCOUNTER — APPOINTMENT (OUTPATIENT)
Dept: INTERNAL MEDICINE | Facility: CLINIC | Age: 25
End: 2025-07-17
Payer: COMMERCIAL

## 2025-07-17 ENCOUNTER — LABORATORY RESULT (OUTPATIENT)
Age: 25
End: 2025-07-17

## 2025-07-17 VITALS
SYSTOLIC BLOOD PRESSURE: 93 MMHG | OXYGEN SATURATION: 98 % | HEIGHT: 66 IN | TEMPERATURE: 98.5 F | WEIGHT: 143 LBS | DIASTOLIC BLOOD PRESSURE: 55 MMHG | BODY MASS INDEX: 22.98 KG/M2 | HEART RATE: 67 BPM

## 2025-07-17 PROCEDURE — 36415 COLL VENOUS BLD VENIPUNCTURE: CPT

## 2025-07-17 PROCEDURE — 99395 PREV VISIT EST AGE 18-39: CPT

## 2025-07-18 RX ORDER — ERGOCALCIFEROL 1.25 MG/1
1.25 MG CAPSULE, LIQUID FILLED ORAL
Qty: 5 | Refills: 3 | Status: ACTIVE | COMMUNITY
Start: 2025-07-18 | End: 1900-01-01

## 2025-07-20 LAB
25(OH)D3 SERPL-MCNC: 14.8 NG/ML
ALBUMIN SERPL ELPH-MCNC: 4.8 G/DL
ALP BLD-CCNC: 88 U/L
ALT SERPL-CCNC: 15 U/L
ANION GAP SERPL CALC-SCNC: 14 MMOL/L
APPEARANCE: CLEAR
AST SERPL-CCNC: 15 U/L
BASOPHILS # BLD AUTO: 0.06 K/UL
BASOPHILS NFR BLD AUTO: 0.8 %
BILIRUB SERPL-MCNC: 0.6 MG/DL
BILIRUBIN URINE: NEGATIVE
BLOOD URINE: NEGATIVE
BUN SERPL-MCNC: 12 MG/DL
C TRACH RRNA SPEC QL NAA+PROBE: NOT DETECTED
CALCIUM SERPL-MCNC: 9.4 MG/DL
CHLORIDE SERPL-SCNC: 104 MMOL/L
CHOLEST SERPL-MCNC: 163 MG/DL
CO2 SERPL-SCNC: 21 MMOL/L
COLOR: YELLOW
CREAT SERPL-MCNC: 0.79 MG/DL
EGFRCR SERPLBLD CKD-EPI 2021: 107 ML/MIN/1.73M2
EOSINOPHIL # BLD AUTO: 0.12 K/UL
EOSINOPHIL NFR BLD AUTO: 1.5 %
GLUCOSE QUALITATIVE U: NEGATIVE MG/DL
GLUCOSE SERPL-MCNC: 98 MG/DL
HCT VFR BLD CALC: 43.3 %
HDLC SERPL-MCNC: 54 MG/DL
HGB BLD-MCNC: 14.1 G/DL
HIV1+2 AB SPEC QL IA.RAPID: NONREACTIVE
HSV 1+2 IGG SER IA-IMP: ABNORMAL
HSV 1+2 IGG SER IA-IMP: NEGATIVE
HSV1 IGG SER QL: 0.98 INDEX
HSV2 IGG SER QL: 0.07 INDEX
IMM GRANULOCYTES NFR BLD AUTO: 0.3 %
KETONES URINE: NEGATIVE MG/DL
LDLC SERPL-MCNC: 92 MG/DL
LEUKOCYTE ESTERASE URINE: ABNORMAL
LYMPHOCYTES # BLD AUTO: 2.26 K/UL
LYMPHOCYTES NFR BLD AUTO: 29.1 %
MAN DIFF?: NORMAL
MCHC RBC-ENTMCNC: 29.2 PG
MCHC RBC-ENTMCNC: 32.6 G/DL
MCV RBC AUTO: 89.6 FL
MONOCYTES # BLD AUTO: 0.58 K/UL
MONOCYTES NFR BLD AUTO: 7.5 %
N GONORRHOEA RRNA SPEC QL NAA+PROBE: NOT DETECTED
NEUTROPHILS # BLD AUTO: 4.73 K/UL
NEUTROPHILS NFR BLD AUTO: 60.8 %
NITRITE URINE: NEGATIVE
NONHDLC SERPL-MCNC: 110 MG/DL
PH URINE: 7
PLATELET # BLD AUTO: 242 K/UL
POTASSIUM SERPL-SCNC: 4.4 MMOL/L
PROT SERPL-MCNC: 7.4 G/DL
PROTEIN URINE: NEGATIVE MG/DL
RBC # BLD: 4.83 M/UL
RBC # FLD: 13 %
SODIUM SERPL-SCNC: 138 MMOL/L
SOURCE AMPLIFICATION: NORMAL
SPECIFIC GRAVITY URINE: 1
T PALLIDUM AB SER QL IA: NEGATIVE
TRIGL SERPL-MCNC: 99 MG/DL
TSH SERPL-ACNC: 1.23 UIU/ML
UROBILINOGEN URINE: 0.2 MG/DL
WBC # FLD AUTO: 7.77 K/UL